# Patient Record
Sex: FEMALE | Race: WHITE | NOT HISPANIC OR LATINO
[De-identification: names, ages, dates, MRNs, and addresses within clinical notes are randomized per-mention and may not be internally consistent; named-entity substitution may affect disease eponyms.]

---

## 2019-11-20 PROBLEM — Z00.00 ENCOUNTER FOR PREVENTIVE HEALTH EXAMINATION: Status: ACTIVE | Noted: 2019-11-20

## 2019-11-22 ENCOUNTER — APPOINTMENT (OUTPATIENT)
Dept: OTOLARYNGOLOGY | Facility: CLINIC | Age: 64
End: 2019-11-22
Payer: COMMERCIAL

## 2019-11-22 VITALS — HEIGHT: 62 IN | WEIGHT: 159 LBS | BODY MASS INDEX: 29.26 KG/M2

## 2019-11-22 DIAGNOSIS — H93.19 TINNITUS, UNSPECIFIED EAR: ICD-10-CM

## 2019-11-22 DIAGNOSIS — R42 DIZZINESS AND GIDDINESS: ICD-10-CM

## 2019-11-22 DIAGNOSIS — Z85.828 PERSONAL HISTORY OF OTHER MALIGNANT NEOPLASM OF SKIN: ICD-10-CM

## 2019-11-22 DIAGNOSIS — Z83.3 FAMILY HISTORY OF DIABETES MELLITUS: ICD-10-CM

## 2019-11-22 DIAGNOSIS — Z78.9 OTHER SPECIFIED HEALTH STATUS: ICD-10-CM

## 2019-11-22 DIAGNOSIS — F17.200 NICOTINE DEPENDENCE, UNSPECIFIED, UNCOMPLICATED: ICD-10-CM

## 2019-11-22 DIAGNOSIS — I10 ESSENTIAL (PRIMARY) HYPERTENSION: ICD-10-CM

## 2019-11-22 DIAGNOSIS — Z80.1 FAMILY HISTORY OF MALIGNANT NEOPLASM OF TRACHEA, BRONCHUS AND LUNG: ICD-10-CM

## 2019-11-22 PROCEDURE — 92570 ACOUSTIC IMMITANCE TESTING: CPT

## 2019-11-22 PROCEDURE — 99204 OFFICE O/P NEW MOD 45 MIN: CPT | Mod: 25

## 2019-11-22 PROCEDURE — 92557 COMPREHENSIVE HEARING TEST: CPT

## 2019-11-22 RX ORDER — LOSARTAN POTASSIUM 100 MG/1
TABLET, FILM COATED ORAL
Refills: 0 | Status: ACTIVE | COMMUNITY

## 2019-11-22 RX ORDER — AMLODIPINE BESYLATE 5 MG/1
TABLET ORAL
Refills: 0 | Status: ACTIVE | COMMUNITY

## 2019-11-22 NOTE — HISTORY OF PRESENT ILLNESS
[de-identified] : 64 year old patient is present today for a second opinion for tinnitus, dizziness, and hearing loss. Patient did a VNG, and audiogram last year. She reports  having felt as though her ears were clogged, went to another ENT, no further testing done. Went to another ENT, who referred her for VNG, told all was fine. She also reports ~40% loss of hearing. Has not been evaluated for hearing aids. She reports left sided tinnitus. She no longer has dizziness, stopped in the summer. Occurred when sent from sitting to standing, in AM only. SHe is on anti-HTN medications, has been on it for a while.

## 2019-11-22 NOTE — DATA REVIEWED
[de-identified] : relevant images and reports personally reviewed by me:\par \par 11/22/19: hearing -8khz sloping to mod/severe SNHL thereafter bilaterally; type A tymps bilaterally\par \par 6/7/19: VNG WNL; hearing -3khz sloping to mod SNHL at 4khz, rising to mikld HL at 8khz bilaterally. Type A tymps bilaterally

## 2019-11-22 NOTE — REASON FOR VISIT
[Initial Evaluation] : an initial evaluation for [FreeTextEntry2] : tinnitus, dizziness, and hearing loss.

## 2019-11-22 NOTE — CONSULT LETTER
[Dear  ___] : Dear  [unfilled], [Consult Letter:] : I had the pleasure of evaluating your patient, [unfilled]. [Please see my note below.] : Please see my note below. [Consult Closing:] : Thank you very much for allowing me to participate in the care of this patient.  If you have any questions, please do not hesitate to contact me. [Sincerely,] : Sincerely, [FreeTextEntry2] : Jabari Yung MD [FreeTextEntry3] : Kassandra Adame MD\par Otolaryngology - Head & Neck Surgery\par

## 2019-11-22 NOTE — ASSESSMENT
[FreeTextEntry1] : - reviewed audiogram with patient, mild to moderate SNHL. She is not interested in hearing aids at this time\par - dizziness has resolved, based on history, suspect orthostatic component\par - - reviewed natural history of tinnitus\par - encourage use of distraction techniques, such as background low level music or a noise maker or fan. \par - followup for annual hearing test.\par

## 2021-05-28 ENCOUNTER — APPOINTMENT (OUTPATIENT)
Dept: OTOLARYNGOLOGY | Facility: CLINIC | Age: 66
End: 2021-05-28
Payer: COMMERCIAL

## 2021-05-28 DIAGNOSIS — H93.13 TINNITUS, BILATERAL: ICD-10-CM

## 2021-05-28 DIAGNOSIS — J34.89 OTHER SPECIFIED DISORDERS OF NOSE AND NASAL SINUSES: ICD-10-CM

## 2021-05-28 DIAGNOSIS — H90.3 SENSORINEURAL HEARING LOSS, BILATERAL: ICD-10-CM

## 2021-05-28 PROCEDURE — 31231 NASAL ENDOSCOPY DX: CPT

## 2021-05-28 PROCEDURE — 99213 OFFICE O/P EST LOW 20 MIN: CPT | Mod: 25

## 2021-05-28 PROCEDURE — 92570 ACOUSTIC IMMITANCE TESTING: CPT

## 2021-05-28 PROCEDURE — 92557 COMPREHENSIVE HEARING TEST: CPT

## 2021-05-28 NOTE — DATA REVIEWED
[de-identified] : relevant images and reports personally reviewed by me:\par \par 5/28/21: type A tymps AU, hearing WNL sloping to mod SNHL AU, asymmetry noted 6khz w left ear poorer Airway patent, Nasal mucosa clear. Mouth with normal mucosa. Throat has no vesicles, no oropharyngeal exudates and uvula is midline.

## 2021-05-28 NOTE — ASSESSMENT
[FreeTextEntry1] : - reviewed audiogram from today, essentially stable\par - reassured patient of absence of pathology of sinonasal cavity, saline nasal spray, aquaphor anterior nasal cavity BID, humidifier\par - - reviewed natural history of tinnitus\par - encourage use of distraction techniques, such as background low level music or a noise maker or fan. \par - followup for annual hearing test.\par

## 2021-05-28 NOTE — HISTORY OF PRESENT ILLNESS
[de-identified] : 64 year old patient is present today for a second opinion for tinnitus, dizziness, and hearing loss. Patient did a VNG, and audiogram last year. She reports  having felt as though her ears were clogged, went to another ENT, no further testing done. Went to another ENT, who referred her for VNG, told all was fine. She also reports ~40% loss of hearing. Has not been evaluated for hearing aids. She reports left sided tinnitus. She no longer has dizziness, stopped in the summer. Occurred when sent from sitting to standing, in AM only. SHe is on anti-HTN medications, has been on it for a while.  [FreeTextEntry1] : \par 05/28/21: Patient presents today c/o nose irritation, tinnitus . She has noticed that her nose is sore and has some  crusting . She has been using Neosporin and Aquaphor for her nose and occasionally uses a nasal saline spray . \par At time has a foul smell in nose for the last couple of months, has been on and off.  She  may have seasonal allergies , tends to rub eyes. + post-nasal drip for several years. \par \par She is also experiencing Left ear tinnitus , has constant ringing through out the day .  Has tinnitus since her last visit in 2019. At times she feels that that both her ears pop  and click.  She feels like she on a plane.   Denies any ear infection since last visit.

## 2021-08-23 ENCOUNTER — RESULT REVIEW (OUTPATIENT)
Age: 66
End: 2021-08-23

## 2021-08-23 ENCOUNTER — OUTPATIENT (OUTPATIENT)
Dept: OUTPATIENT SERVICES | Facility: HOSPITAL | Age: 66
LOS: 1 days | Discharge: HOME | End: 2021-08-23
Payer: COMMERCIAL

## 2021-08-23 VITALS
SYSTOLIC BLOOD PRESSURE: 150 MMHG | WEIGHT: 162.04 LBS | DIASTOLIC BLOOD PRESSURE: 90 MMHG | OXYGEN SATURATION: 97 % | RESPIRATION RATE: 16 BRPM | HEIGHT: 62 IN | TEMPERATURE: 98 F | HEART RATE: 70 BPM

## 2021-08-23 DIAGNOSIS — Z90.49 ACQUIRED ABSENCE OF OTHER SPECIFIED PARTS OF DIGESTIVE TRACT: Chronic | ICD-10-CM

## 2021-08-23 DIAGNOSIS — M25.511 PAIN IN RIGHT SHOULDER: ICD-10-CM

## 2021-08-23 DIAGNOSIS — Z98.890 OTHER SPECIFIED POSTPROCEDURAL STATES: Chronic | ICD-10-CM

## 2021-08-23 DIAGNOSIS — C44.90 UNSPECIFIED MALIGNANT NEOPLASM OF SKIN, UNSPECIFIED: Chronic | ICD-10-CM

## 2021-08-23 DIAGNOSIS — Z01.818 ENCOUNTER FOR OTHER PREPROCEDURAL EXAMINATION: ICD-10-CM

## 2021-08-23 LAB
ALBUMIN SERPL ELPH-MCNC: 4.4 G/DL — SIGNIFICANT CHANGE UP (ref 3.5–5.2)
ALP SERPL-CCNC: 77 U/L — SIGNIFICANT CHANGE UP (ref 30–115)
ALT FLD-CCNC: 24 U/L — SIGNIFICANT CHANGE UP (ref 0–41)
ANION GAP SERPL CALC-SCNC: 14 MMOL/L — SIGNIFICANT CHANGE UP (ref 7–14)
APTT BLD: 42.2 SEC — HIGH (ref 27–39.2)
AST SERPL-CCNC: 20 U/L — SIGNIFICANT CHANGE UP (ref 0–41)
BASOPHILS # BLD AUTO: 0.07 K/UL — SIGNIFICANT CHANGE UP (ref 0–0.2)
BASOPHILS NFR BLD AUTO: 1.2 % — HIGH (ref 0–1)
BILIRUB SERPL-MCNC: 0.7 MG/DL — SIGNIFICANT CHANGE UP (ref 0.2–1.2)
BUN SERPL-MCNC: 16 MG/DL — SIGNIFICANT CHANGE UP (ref 10–20)
CALCIUM SERPL-MCNC: 9.8 MG/DL — SIGNIFICANT CHANGE UP (ref 8.5–10.1)
CHLORIDE SERPL-SCNC: 102 MMOL/L — SIGNIFICANT CHANGE UP (ref 98–110)
CO2 SERPL-SCNC: 24 MMOL/L — SIGNIFICANT CHANGE UP (ref 17–32)
CREAT SERPL-MCNC: 0.6 MG/DL — LOW (ref 0.7–1.5)
EOSINOPHIL # BLD AUTO: 0.23 K/UL — SIGNIFICANT CHANGE UP (ref 0–0.7)
EOSINOPHIL NFR BLD AUTO: 3.9 % — SIGNIFICANT CHANGE UP (ref 0–8)
GLUCOSE SERPL-MCNC: 107 MG/DL — HIGH (ref 70–99)
HCT VFR BLD CALC: 43.6 % — SIGNIFICANT CHANGE UP (ref 37–47)
HGB BLD-MCNC: 14.1 G/DL — SIGNIFICANT CHANGE UP (ref 12–16)
IMM GRANULOCYTES NFR BLD AUTO: 0.2 % — SIGNIFICANT CHANGE UP (ref 0.1–0.3)
INR BLD: 1 RATIO — SIGNIFICANT CHANGE UP (ref 0.65–1.3)
LYMPHOCYTES # BLD AUTO: 1.83 K/UL — SIGNIFICANT CHANGE UP (ref 1.2–3.4)
LYMPHOCYTES # BLD AUTO: 31.3 % — SIGNIFICANT CHANGE UP (ref 20.5–51.1)
MCHC RBC-ENTMCNC: 29.1 PG — SIGNIFICANT CHANGE UP (ref 27–31)
MCHC RBC-ENTMCNC: 32.3 G/DL — SIGNIFICANT CHANGE UP (ref 32–37)
MCV RBC AUTO: 90.1 FL — SIGNIFICANT CHANGE UP (ref 81–99)
MONOCYTES # BLD AUTO: 0.49 K/UL — SIGNIFICANT CHANGE UP (ref 0.1–0.6)
MONOCYTES NFR BLD AUTO: 8.4 % — SIGNIFICANT CHANGE UP (ref 1.7–9.3)
NEUTROPHILS # BLD AUTO: 3.22 K/UL — SIGNIFICANT CHANGE UP (ref 1.4–6.5)
NEUTROPHILS NFR BLD AUTO: 55 % — SIGNIFICANT CHANGE UP (ref 42.2–75.2)
NRBC # BLD: 0 /100 WBCS — SIGNIFICANT CHANGE UP (ref 0–0)
PLATELET # BLD AUTO: 311 K/UL — SIGNIFICANT CHANGE UP (ref 130–400)
POTASSIUM SERPL-MCNC: 3.7 MMOL/L — SIGNIFICANT CHANGE UP (ref 3.5–5)
POTASSIUM SERPL-SCNC: 3.7 MMOL/L — SIGNIFICANT CHANGE UP (ref 3.5–5)
PROT SERPL-MCNC: 7.3 G/DL — SIGNIFICANT CHANGE UP (ref 6–8)
PROTHROM AB SERPL-ACNC: 11.5 SEC — SIGNIFICANT CHANGE UP (ref 9.95–12.87)
RBC # BLD: 4.84 M/UL — SIGNIFICANT CHANGE UP (ref 4.2–5.4)
RBC # FLD: 13.2 % — SIGNIFICANT CHANGE UP (ref 11.5–14.5)
SODIUM SERPL-SCNC: 140 MMOL/L — SIGNIFICANT CHANGE UP (ref 135–146)
WBC # BLD: 5.85 K/UL — SIGNIFICANT CHANGE UP (ref 4.8–10.8)
WBC # FLD AUTO: 5.85 K/UL — SIGNIFICANT CHANGE UP (ref 4.8–10.8)

## 2021-08-23 PROCEDURE — 93010 ELECTROCARDIOGRAM REPORT: CPT

## 2021-08-23 PROCEDURE — 71046 X-RAY EXAM CHEST 2 VIEWS: CPT | Mod: 26

## 2021-08-23 NOTE — H&P PST ADULT - NSICDXPASTMEDICALHX_GEN_ALL_CORE_FT
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

## 2021-08-23 NOTE — H&P PST ADULT - NSICDXPASTSURGICALHX_GEN_ALL_CORE_FT
PAST SURGICAL HISTORY:  History of cholecystectomy     History of D&C     Skin cancer Amputation 5TH FINGER

## 2021-08-23 NOTE — H&P PST ADULT - HISTORY OF PRESENT ILLNESS
65YR old female states pain RT shoulder for about 6yr s-PT - 2 times in 4yrs helped for about 4yrs but recurrence of pain which is worsening and limiting ROM- opts to have surgery- is scheduled for RIGHT SHOULDER ARTHROSCOPY ROTATOR CUFF REPAIR DECOMPRESSION. Denies COVID S/S. Recd 2 doses of COVID vaccine. Verbalized understanding of COVID prevention measures. Exercise donny-2 FOS. 65YR old female states pain RT shoulder for about 6yr s-PT - 2 times in 4yrs helped for about 4yrs but recurrence of pain which is worsening and limiting ROM- minimal improvement with meds -opts to have surgery- is scheduled for RIGHT SHOULDER ARTHROSCOPY ROTATOR CUFF REPAIR DECOMPRESSION. Denies COVID S/S. Recd 2 doses of COVID vaccine. Verbalized understanding of COVID prevention measures. Exercise donny-2 FOS.  Anesthesia Alert  YES--Difficult Airway  NO--History of neck surgery or radiation  YES--Limited ROM of neck  NO--History of Malignant hyperthermia  NO--Personal or family history of Pseudocholinesterase deficiency  NO--Prior Anesthesia Complication  NO--Latex Allergy  NO--Loose teeth  NO--History of Rheumatoid Arthritis  NO--JESUS  No Bleeding risk  NO--Other_____

## 2021-09-10 ENCOUNTER — OUTPATIENT (OUTPATIENT)
Dept: OUTPATIENT SERVICES | Facility: HOSPITAL | Age: 66
LOS: 1 days | Discharge: HOME | End: 2021-09-10

## 2021-09-10 DIAGNOSIS — Z90.49 ACQUIRED ABSENCE OF OTHER SPECIFIED PARTS OF DIGESTIVE TRACT: Chronic | ICD-10-CM

## 2021-09-10 DIAGNOSIS — C44.90 UNSPECIFIED MALIGNANT NEOPLASM OF SKIN, UNSPECIFIED: Chronic | ICD-10-CM

## 2021-09-10 DIAGNOSIS — Z98.890 OTHER SPECIFIED POSTPROCEDURAL STATES: Chronic | ICD-10-CM

## 2021-09-10 DIAGNOSIS — Z11.59 ENCOUNTER FOR SCREENING FOR OTHER VIRAL DISEASES: ICD-10-CM

## 2021-09-12 PROBLEM — C44.90 UNSPECIFIED MALIGNANT NEOPLASM OF SKIN, UNSPECIFIED: Chronic | Status: ACTIVE | Noted: 2021-08-23

## 2021-09-12 PROBLEM — U07.1 COVID-19: Chronic | Status: ACTIVE | Noted: 2021-08-23

## 2021-09-12 PROBLEM — I10 ESSENTIAL (PRIMARY) HYPERTENSION: Chronic | Status: ACTIVE | Noted: 2021-08-23

## 2021-09-12 PROBLEM — H93.19 TINNITUS, UNSPECIFIED EAR: Chronic | Status: ACTIVE | Noted: 2021-08-23

## 2021-09-12 PROBLEM — H91.90 UNSPECIFIED HEARING LOSS, UNSPECIFIED EAR: Chronic | Status: ACTIVE | Noted: 2021-08-23

## 2021-09-12 PROBLEM — Z87.898 PERSONAL HISTORY OF OTHER SPECIFIED CONDITIONS: Chronic | Status: ACTIVE | Noted: 2021-08-23

## 2021-09-13 ENCOUNTER — OUTPATIENT (OUTPATIENT)
Dept: OUTPATIENT SERVICES | Facility: HOSPITAL | Age: 66
LOS: 1 days | Discharge: HOME | End: 2021-09-13
Payer: MEDICARE

## 2021-09-13 ENCOUNTER — RESULT REVIEW (OUTPATIENT)
Age: 66
End: 2021-09-13

## 2021-09-13 VITALS
HEIGHT: 62 IN | DIASTOLIC BLOOD PRESSURE: 91 MMHG | RESPIRATION RATE: 18 BRPM | OXYGEN SATURATION: 98 % | HEART RATE: 77 BPM | WEIGHT: 162.04 LBS | TEMPERATURE: 98 F | SYSTOLIC BLOOD PRESSURE: 149 MMHG

## 2021-09-13 VITALS
RESPIRATION RATE: 19 BRPM | SYSTOLIC BLOOD PRESSURE: 125 MMHG | HEART RATE: 74 BPM | DIASTOLIC BLOOD PRESSURE: 62 MMHG | OXYGEN SATURATION: 97 %

## 2021-09-13 DIAGNOSIS — Z98.890 OTHER SPECIFIED POSTPROCEDURAL STATES: Chronic | ICD-10-CM

## 2021-09-13 DIAGNOSIS — Z87.39 PERSONAL HISTORY OF OTHER DISEASES OF THE MUSCULOSKELETAL SYSTEM AND CONNECTIVE TISSUE: ICD-10-CM

## 2021-09-13 DIAGNOSIS — Z90.49 ACQUIRED ABSENCE OF OTHER SPECIFIED PARTS OF DIGESTIVE TRACT: Chronic | ICD-10-CM

## 2021-09-13 DIAGNOSIS — C44.90 UNSPECIFIED MALIGNANT NEOPLASM OF SKIN, UNSPECIFIED: Chronic | ICD-10-CM

## 2021-09-13 PROCEDURE — 88304 TISSUE EXAM BY PATHOLOGIST: CPT | Mod: 26

## 2021-09-13 RX ORDER — LOSARTAN/HYDROCHLOROTHIAZIDE 100MG-25MG
1 TABLET ORAL
Qty: 0 | Refills: 0 | DISCHARGE

## 2021-09-13 RX ORDER — HYDROMORPHONE HYDROCHLORIDE 2 MG/ML
0.5 INJECTION INTRAMUSCULAR; INTRAVENOUS; SUBCUTANEOUS ONCE
Refills: 0 | Status: DISCONTINUED | OUTPATIENT
Start: 2021-09-13 | End: 2021-09-13

## 2021-09-13 RX ORDER — SODIUM CHLORIDE 9 MG/ML
1000 INJECTION, SOLUTION INTRAVENOUS
Refills: 0 | Status: DISCONTINUED | OUTPATIENT
Start: 2021-09-13 | End: 2021-09-27

## 2021-09-13 RX ORDER — ACETAMINOPHEN 500 MG
650 TABLET ORAL ONCE
Refills: 0 | Status: DISCONTINUED | OUTPATIENT
Start: 2021-09-13 | End: 2021-09-27

## 2021-09-13 RX ORDER — AMLODIPINE BESYLATE 2.5 MG/1
1 TABLET ORAL
Qty: 0 | Refills: 0 | DISCHARGE

## 2021-09-13 RX ORDER — HYDROMORPHONE HYDROCHLORIDE 2 MG/ML
0.25 INJECTION INTRAMUSCULAR; INTRAVENOUS; SUBCUTANEOUS
Refills: 0 | Status: DISCONTINUED | OUTPATIENT
Start: 2021-09-13 | End: 2021-09-13

## 2021-09-13 RX ORDER — ONDANSETRON 8 MG/1
4 TABLET, FILM COATED ORAL ONCE
Refills: 0 | Status: DISCONTINUED | OUTPATIENT
Start: 2021-09-13 | End: 2021-09-27

## 2021-09-13 RX ADMIN — SODIUM CHLORIDE 100 MILLILITER(S): 9 INJECTION, SOLUTION INTRAVENOUS at 11:39

## 2021-09-13 NOTE — ASU DISCHARGE PLAN (ADULT/PEDIATRIC) - CARE PROVIDER_API CALL
Jose Eduardo Kennedy)  [facility]  [address] [place] [address] [place] [address]  Phone: [phone]  Fax: [phone]  Follow Up Time:

## 2021-09-13 NOTE — BRIEF OPERATIVE NOTE - NSICDXBRIEFPROCEDURE_GEN_ALL_CORE_FT
PROCEDURES:  Repair, rotator cuff, arthroscopic 13-Sep-2021 09:09:37  Jose Eduardo Kennedy  Arthroscopic debridement, shoulder, extensive 13-Sep-2021 09:09:44  Jose Eduardo Kennedy  Subacromial decompression 13-Sep-2021 09:09:48  Jose Eduardo Kennedy

## 2021-09-13 NOTE — CHART NOTE - NSCHARTNOTEFT_GEN_A_CORE
PACU ANESTHESIA ADMISSION NOTE      Procedure: Repair, rotator cuff, arthroscopic    Arthroscopic debridement, shoulder, extensive    Subacromial decompression      Post op diagnosis:  History of rotator cuff tear        ____  Intubated  TV:______       Rate: ______      FiO2: ______    __x__  Patent Airway    __x__  Full return of protective reflexes    __x__  Full recovery from anesthesia / back to baseline status    Vitals:  T(C): 36.8 (09-13-21 @ 09:35), Max: 36.8 (09-13-21 @ 08:26)  HR: 77 (09-13-21 @ 09:35) (77 - 77)  BP: 149/91 (09-13-21 @ 09:35) (149/91 - 149/91)  RR: 18 (09-13-21 @ 09:35) (18 - 18)  SpO2: 98% (09-13-21 @ 09:35) (98% - 98%)    Mental Status:  __x__ Awake   ___x__ Alert   _____ Drowsy   _____ Sedated    Nausea/Vomiting:  __x__ NO  ______Yes,   See Post - Op Orders          Pain Scale (0-10):  ___0__    Treatment: ____ None    __x__ See Post - Op/PCA Orders    Post - Operative Fluids:   ____ Oral   __x__ See Post - Op Orders    Plan: Discharge:   __x__Home       _____Floor     _____Critical Care    _____  Other:_________________    Comments: Patient had smooth intraoperative event, no anesthesia complication.  PACU Vital signs: HR:   75         BP:      90  /  55        RR:   18          O2 Sat:      95 %     Temp 97.6F

## 2021-09-15 LAB — SURGICAL PATHOLOGY STUDY: SIGNIFICANT CHANGE UP

## 2021-09-17 DIAGNOSIS — M75.01 ADHESIVE CAPSULITIS OF RIGHT SHOULDER: ICD-10-CM

## 2021-09-17 DIAGNOSIS — M65.811 OTHER SYNOVITIS AND TENOSYNOVITIS, RIGHT SHOULDER: ICD-10-CM

## 2021-09-17 DIAGNOSIS — H91.90 UNSPECIFIED HEARING LOSS, UNSPECIFIED EAR: ICD-10-CM

## 2021-09-17 DIAGNOSIS — Z88.1 ALLERGY STATUS TO OTHER ANTIBIOTIC AGENTS STATUS: ICD-10-CM

## 2021-09-17 DIAGNOSIS — M75.121 COMPLETE ROTATOR CUFF TEAR OR RUPTURE OF RIGHT SHOULDER, NOT SPECIFIED AS TRAUMATIC: ICD-10-CM

## 2021-09-17 DIAGNOSIS — Z87.891 PERSONAL HISTORY OF NICOTINE DEPENDENCE: ICD-10-CM

## 2021-09-17 DIAGNOSIS — Z88.8 ALLERGY STATUS TO OTHER DRUGS, MEDICAMENTS AND BIOLOGICAL SUBSTANCES STATUS: ICD-10-CM

## 2021-09-17 DIAGNOSIS — Z20.818 CONTACT WITH AND (SUSPECTED) EXPOSURE TO OTHER BACTERIAL COMMUNICABLE DISEASES: ICD-10-CM

## 2021-09-17 DIAGNOSIS — I10 ESSENTIAL (PRIMARY) HYPERTENSION: ICD-10-CM

## 2021-09-17 DIAGNOSIS — M24.111 OTHER ARTICULAR CARTILAGE DISORDERS, RIGHT SHOULDER: ICD-10-CM

## 2021-09-17 DIAGNOSIS — M25.811 OTHER SPECIFIED JOINT DISORDERS, RIGHT SHOULDER: ICD-10-CM

## 2021-09-17 DIAGNOSIS — Z85.828 PERSONAL HISTORY OF OTHER MALIGNANT NEOPLASM OF SKIN: ICD-10-CM

## 2021-12-14 NOTE — ASU PATIENT PROFILE, ADULT - TEACHING/LEARNING OTHER LEARNERS
Partially impaired: cannot see medication labels or newsprint, but can see obstacles in path, and the surrounding layout; can count fingers at arm's length
Pako/spouse

## 2023-04-13 NOTE — ASU PREOP CHECKLIST - DNR CLARIFICATION FORM COMPLETED
Reason for Call:  Other prescription    Detailed comments: Please call in RX to pharmacy. Out of all 3 medications. Will have nothing after Friday.      Phone Number Patient can be reached at: Home number on file [phone] (home)    Best Time: any    Can we leave a detailed message on this number? YES    Call taken on 4/13/2023 at 1:26 PM by Kiarra Mason       n/a

## 2024-02-23 ENCOUNTER — APPOINTMENT (OUTPATIENT)
Dept: ORTHOPEDIC SURGERY | Facility: CLINIC | Age: 69
End: 2024-02-23
Payer: MEDICARE

## 2024-02-23 VITALS — WEIGHT: 169 LBS | BODY MASS INDEX: 31.1 KG/M2 | HEIGHT: 62 IN

## 2024-02-23 DIAGNOSIS — Z86.79 PERSONAL HISTORY OF OTHER DISEASES OF THE CIRCULATORY SYSTEM: ICD-10-CM

## 2024-02-23 PROCEDURE — 73503 X-RAY EXAM HIP UNI 4/> VIEWS: CPT | Mod: LT

## 2024-02-23 PROCEDURE — 99203 OFFICE O/P NEW LOW 30 MIN: CPT

## 2024-02-23 RX ORDER — NAPROXEN 500 MG/1
500 TABLET ORAL
Qty: 60 | Refills: 0 | Status: ACTIVE | COMMUNITY
Start: 2024-02-23 | End: 1900-01-01

## 2024-03-22 ENCOUNTER — RX RENEWAL (OUTPATIENT)
Age: 69
End: 2024-03-22

## 2024-04-05 ENCOUNTER — APPOINTMENT (OUTPATIENT)
Dept: ORTHOPEDIC SURGERY | Facility: CLINIC | Age: 69
End: 2024-04-05
Payer: MEDICARE

## 2024-04-05 PROCEDURE — 99213 OFFICE O/P EST LOW 20 MIN: CPT

## 2024-04-05 NOTE — HISTORY OF PRESENT ILLNESS
[de-identified] : Patient is a 68-year-old female here for follow-up evaluation of left hip.  Trochanteric bursitis and iliotibial band syndrome.  Patient states is not taking anti-inflammatory medication and going to physical therapy her pain is greatly improved.  Patient states that she still has pain that comes and goes.

## 2024-04-05 NOTE — DISCUSSION/SUMMARY
[de-identified] : Discussed with patient detail that she is doing well at this point, advised continuation of anti-inflammatory medication as needed, will finish off physical therapy.  Patient will follow-up in 3 to 4 months for reevaluation if still experiencing pain.  Patient understands agrees with plan.

## 2024-04-05 NOTE — PHYSICAL EXAM
[Left] : left hip [5___] : adduction 5[unfilled]/5 [2+] : posterior tibialis pulse: 2+ [] : light touch intact throughout

## 2024-04-26 ENCOUNTER — APPOINTMENT (OUTPATIENT)
Dept: ORTHOPEDIC SURGERY | Facility: CLINIC | Age: 69
End: 2024-04-26
Payer: MEDICARE

## 2024-04-26 DIAGNOSIS — M76.32 ILIOTIBIAL BAND SYNDROME, LEFT LEG: ICD-10-CM

## 2024-04-26 PROCEDURE — 20610 DRAIN/INJ JOINT/BURSA W/O US: CPT | Mod: LT

## 2024-04-26 PROCEDURE — 99213 OFFICE O/P EST LOW 20 MIN: CPT | Mod: 25

## 2024-04-26 NOTE — HISTORY OF PRESENT ILLNESS
[de-identified] :   68-year-old female here for repeat evaluation of pain to the left hip. Patient is stated that she has been having on the left lateral aspect of the hip and leg for the past few months, she been doing physical therapy with mild improvement of symptoms. Patient is stated that the very painful especially with ambulation.

## 2024-04-26 NOTE — IMAGING
[de-identified] : Examination of the left lower extremity, patient has tenderness over the tibial band and over the left trochanteric bursa. There are tightness over the iliotibial band. Patient has good range of motion to the lumbar spine with no end of range pain. Lumbar examination is unremarkable. Patient has positive Juan Carlos's. Patient has good range of motion about the hip, no groin tenderness. Negative logrolling. Tenderness over the iliotibial band. Neurovascular intact.  Normal gait.    X-rays taken on previous visit was reviewed in office today, the x-ray of the pelvis is negative for any acute fracture or dislocation, no degenerative changes noted over the hip.

## 2024-04-26 NOTE — DISCUSSION/SUMMARY
[de-identified] :   Impression:Left hip pain possibly due to iliotibial band and trochanteric bursitis.  Plan:  Patient will continue with physical therapy, prescription was given. Patient was offered a cortisone injection, patient agrees. A sterile technique using alcohol as antiseptic and ethyl chloride as anesthetic, 10 mg of dexamethasone was given with 4 mL of 1% lidocaine.   Follow-up:  6 weks

## 2024-06-07 ENCOUNTER — APPOINTMENT (OUTPATIENT)
Dept: ORTHOPEDIC SURGERY | Facility: CLINIC | Age: 69
End: 2024-06-07
Payer: MEDICARE

## 2024-06-07 DIAGNOSIS — M70.62 TROCHANTERIC BURSITIS, LEFT HIP: ICD-10-CM

## 2024-06-07 PROCEDURE — 99213 OFFICE O/P EST LOW 20 MIN: CPT

## 2024-06-07 NOTE — DISCUSSION/SUMMARY
[de-identified] :   Impression:Improved iliotibial band syndrome and improved trochanteric bursitis  Patient was advised for topical anti-inflammatories if needed such as Aspercreme 4% with lidocaine patches, continue with exercises. She will follow-up as needed

## 2024-06-07 NOTE — HISTORY OF PRESENT ILLNESS
[de-identified] :   68-year-old female here for repeat evaluation of pain to the left hip.  Patient states that her pain has improved significantly with physical therapy and cortisone injection given on previous visit. Patient states that she completed the physical therapy and the therapist has discharged her due to significant improvement of symptoms.

## 2024-06-07 NOTE — IMAGING
[de-identified] : Examination of the left lower extremity, patient has No longertenderness over the tibial band and over the left trochanteric bursa. Improved tightness over the iliotibial band. Patient has good range of motion to the lumbar spine with no end of range pain. Lumbar examination is unremarkable.  Patient has good range of motion about the hip, no groin tenderness. Negative logrolling. MildTenderness over the iliotibial band. Neurovascular intact.  Normal gait.

## 2024-07-26 ENCOUNTER — APPOINTMENT (OUTPATIENT)
Dept: ORTHOPEDIC SURGERY | Facility: CLINIC | Age: 69
End: 2024-07-26